# Patient Record
Sex: FEMALE | Race: WHITE
[De-identification: names, ages, dates, MRNs, and addresses within clinical notes are randomized per-mention and may not be internally consistent; named-entity substitution may affect disease eponyms.]

---

## 2018-02-08 ENCOUNTER — HOSPITAL ENCOUNTER (OUTPATIENT)
Dept: HOSPITAL 47 - RADUSWWP | Age: 43
Discharge: HOME | End: 2018-02-08
Payer: MEDICARE

## 2018-02-08 DIAGNOSIS — N20.0: Primary | ICD-10-CM

## 2018-02-08 DIAGNOSIS — N28.1: ICD-10-CM

## 2018-02-08 PROCEDURE — 76770 US EXAM ABDO BACK WALL COMP: CPT

## 2018-02-08 NOTE — US
EXAMINATION TYPE: US kidneys/renal and bladder

 

DATE OF EXAM: 2/8/2018

 

COMPARISON: US renal 9/14/2017, CT abdomen and pelvis 9/14/2017

 

CLINICAL HISTORY: N20.9 Urolithiasis. History of stones. Left flank pain 

 

EXAM MEASUREMENTS:

 

Right Kidney:  12.2 x 4.9 x 4.9 cm

Left Kidney: 12.2 x 4.4 x 4.9 cm

 

 

 

 

Right Kidney: Echogenic foci upper pole measuring 0.6 cm    

Left Kidney: Multiple echogenic foci visualized throughout, largest upper pole measuring 1.1 cm. Hypo
echoic, cystic area visualized in the upper pole measuring 2.4 x 2.0 x 1.6 cm, possible parapelvic cy
st vs other      

Bladder: Echogenic debris visualized within

**Bilateral Jets seen: No. Jets are not visualized after 3 minutes of scanning 

 

 

 

 

 

IMPRESSION:

Markedly abnormal left kidney remains present with multiple nonobstructing calculi and simple appeari
ng cysts. Consider nuclear medicine functional study to assess for left renal function if has not bee
n performed.

## 2018-10-16 ENCOUNTER — HOSPITAL ENCOUNTER (OUTPATIENT)
Dept: HOSPITAL 47 - RADUSWWP | Age: 43
End: 2018-10-16
Attending: FAMILY MEDICINE
Payer: MEDICARE

## 2018-10-16 DIAGNOSIS — R92.8: Primary | ICD-10-CM

## 2018-10-16 PROCEDURE — 77066 DX MAMMO INCL CAD BI: CPT

## 2018-10-16 NOTE — MM
Reason for exam: clinical finding.

Baseline mammogram.



History:

Family history of breast cancer in cousin and breast cancer in aunt.



[facility] Mammo w CAD SILVIA

Bilateral CC and MLO view(s) were taken.

Technologist: Shirley Stack RT (R)(M)

The breast tissue is heterogeneously dense. This may lower the sensitivity of 

mammography.  There is a posterior depth left upper outer quadrant mass 

corresponding to the sonographically normal node. There is a right mass in the 

upper outer quadrant at posterior depth at 11 o'clock corresponding to a cyst on 

ultrasound.



These results were verbally communicated with the patient and result sheet given 

to the patient on 10/16/18.





ASSESSMENT: Benign, BI-RAD 2



RECOMMENDATION:

Routine screening mammogram of both breasts in 1 year.

## 2018-10-16 NOTE — USB
Reason for exam: clinical finding.



History:

Family history of breast cancer in cousin and breast cancer in aunt.

Indicated problem(s): palpable abnormality in the right breast.



Physical Findings:

Nurse Summary: right breast palpable 11 o'clock upper outer quadrant, 0.5 x 0.5cm,

mobile, tender, bilateral nodularity/tenderness on exam (nurse ts).



US Breast BILAT

Technologist: Shirley Stack, RT (R)(M)

Right complete breast ultrasound includes all four quadrants, the retroareolar 

region and axilla. Finding demonstrates a 6 x 3 x 7mm oval, cystic, benign lesion 

at 11 o'clock.



Left complete breast ultrasound includes all four quadrants, the retroareolar 

region and axilla. Finding demonstrates a 6 x 3 x 11mm oval, cystic, benign lesion

at 4 o'clock corresponds to the palpable abnormality and a 5mm node at the 

axilla.



These results were verbally communicated with the patient and result sheet given 

to the patient on 10/16/18.





ASSESSMENT: Incomplete: need additional imaging evaluation, BI-RAD 0



RECOMMENDATION:

Follow-up diagnostic mammogram of both breasts.

## 2018-11-01 ENCOUNTER — HOSPITAL ENCOUNTER (OUTPATIENT)
Dept: HOSPITAL 47 - WWCWWP | Age: 43
Discharge: HOME | End: 2018-11-01
Attending: SURGERY
Payer: MEDICARE

## 2018-11-01 VITALS
HEART RATE: 77 BPM | TEMPERATURE: 99.9 F | DIASTOLIC BLOOD PRESSURE: 80 MMHG | SYSTOLIC BLOOD PRESSURE: 129 MMHG | RESPIRATION RATE: 18 BRPM

## 2018-11-01 VITALS — BODY MASS INDEX: 23.3 KG/M2

## 2018-11-01 DIAGNOSIS — Z53.9: Primary | ICD-10-CM

## 2018-11-01 NOTE — P.GSHP
History of Present Illness


H&P Date: 11/01/18


Chief Complaint: patient complains of tenderness in the right breast for 6 

months





     The patient is a 43 year old white female with a complaint of tenderness 

in her right breast.  This is been present for approximately 6 months.  She had 

her first mammogram and ultrasound performed on [id number].  On the ultrasound 

she was noted to have in the right breast a 6 x 7 mm cystic benign lesion at 11:

00.  In the left breast she was noted to have a 6 x 11 mm cystic lesion at 4:00 

corresponding to the palpable change and a 5 mm node in the axilla.  She then 

had a bilateral mammogram performed which revealed a left upper outer quadrant 

mass corresponding to the sonographically normal node.  In the right a mass in 

the upper outer quadrant corresponding to a cyst seen on ultrasound.  This was 

felt to be benign and it was recommended she have routine screening mammogram 

of both breast in 1 year.  The patient has no history of any trauma or 

infection in the breast. She does smoke and lives with at smoker. She does not 

drink pop. She drinks coffee, about a pot/day. She eats chocolate rarely.  The 

pain is not cyclical.





Family history:


1.sister: cervical cancer


2. cousin maternal: cervical cancer





Hormonal History:


menarche: 12


pregnancy: 3, 2 miscarriages, breast fed: none, first at 15


periods: partial hysterectomy for bleeding left her ovaries


BCP: 2 years


hormones: none





Past Surgical History:


1. partial nephrectomy for Wilhms tumor


2. hysterectomy


3. gallbladder


4. partial knee 


5. tonsil


 


Past Medical History:


1. kidney dysfunction


2. diabetic diet controlled





Social History:


smoke: 1/PPD for 15 years


alcohol: rare


drugs: none














- Constitutional


Constitutional: Reports sweats





- EENT


Comment: 





vertigo


Eyes: bilateral bulging eye, bilateral pain


Ears: right: tinnitus, deny: decreased hearing


Ears, nose, mouth and throat: Reports headache, Denies sore throat





- Breasts


Breasts: bilateral: as per HPI





- Cardiovascular


Comment: 





prolapsed mitral valve





- Respiratory


Comment: 





asthma, 


Respiratory: Denies cough, Denies 7





- Gastrointestinal


Comment: 





PUD


Gastrointestinal: Denies abdominal pain, Denies diarrhea, Denies nausea, Denies 

vomiting





- Genitourinary (Female)


Genitourinary: Reports hematuria, Reports kidney stones





- Menstruation


Menstruation: Reports post hysterectomy





- Musculoskeletal


Comment: 





arthritis/fibromyalgia





- Integumentary


Integumentary: Denies pruritus, Denies rash





- Neurological


Neurological: Reports numbness, Denies weakness





- Psychiatric


Psychiatric: Reports anxiety





- Endocrine


Comment: 





diabetes, over-active thyroid





- Hematologic/Lymphatic


Comment: 





none





- Allergic/Immunologic


Allergic/Immunologic: Reports seasonal allergies





Past Medical History


Past Surgical History: Cholecystectomy, Hysterectomy, Orthopedic Surgery


Past Psychological History: Anxiety


Smoking Status: Current every day smoker





Surgical - Exam





- General


well developed, well nourished, no distress





- Eyes


normal ocular movement





- ENT


no hearing loss, no congestion





- Neck


trachea midline





- Respiratory


normal respiratory effort, clear to auscultation





- Cardiovascular


Rhythm: regular


Heart Sounds: normal: S1, S2





- Abdomen


Abdomen: soft, non tender, no guarding, no rigid, no rebound





- Integumentary


no rash, no abnormal pigmentation





- Neurologic


no disoriented, no combative





- Musculoskeletal


normal gait, normal posture





- Psychiatric


oriented to time, oriented to person, oriented to place, speech is normal, 

memory intact





Breast examination:


Right breast: Multi-positional exam no dominant masses or nodules of concern 

noted


Right axilla: No adenopathy of concern


Left breast: Multi-positional exam of dominant masses or nodules of concern


Left axilla: No adenopathy of concern





Results





RADIOGRAPH reports reviewed





Assessment and Plan


Assessment: 





Impression:


1.  Pain right breast


2.  Fibrocystic breast changes


3.  Wilms tumor left kidney status post partial nephrectomy


4.  History of kidney stones


5.  arthritis


6.  Mitral valve prolapse


7.  Status post cholecystectomy


8.  Nicotine dependence


9.  Hypoparathyroidism,


10.  Diabetes


Plan:


1.  Discussion regarding fibrocystic breast disease and breast pain


2.  Patient will decide based on discussion regarding smoking and caffeine 

intake


3.  Medical management of medical problems


CC: Dr. Kraus

## 2019-02-15 ENCOUNTER — HOSPITAL ENCOUNTER (OUTPATIENT)
Dept: HOSPITAL 47 - RADUSWWP | Age: 44
End: 2019-02-15
Attending: FAMILY MEDICINE
Payer: MEDICARE

## 2019-02-15 DIAGNOSIS — N13.2: Primary | ICD-10-CM

## 2019-02-15 PROCEDURE — 74176 CT ABD & PELVIS W/O CONTRAST: CPT

## 2019-02-15 PROCEDURE — 76770 US EXAM ABDO BACK WALL COMP: CPT

## 2019-02-15 NOTE — US
EXAMINATION TYPE: US kidneys/renal and bladder

 

DATE OF EXAM: 2/15/2019

 

COMPARISON: 2/8/2018, 9/14/2017 and 2/6/2017 renal ultrasound as well as CT dated 9/14/2017

 

CLINICAL HISTORY: Kidney Pain N23. left kidney pain, known stones

 

EXAM MEASUREMENTS:

 

Right Kidney:  10.8 x 5.3 x 5.4 cm

Left Kidney: 11.4 x 6.8 x 7.2 cm

 

Right Kidney: No hydronephrosis or masses seen  

Left Kidney: multiple stones seen with hydronephrosis present, largest stone appears to be laterally 
= 1.3cm   

Bladder: wnl

**Bilateral Jets seen: yes

 

 

IMPRESSION:

1. Continued worsening of the markedly abnormal appearance of the left kidney with severe left hydron
ephrosis and multiple renal calculi. Cortical renal thinning indicates chronicity.

2. No hydronephrosis or nephrolithiasis on the right.

## 2019-02-15 NOTE — CT
EXAMINATION TYPE: CT abdomen pelvis wo con

 

DATE OF EXAM: 2/15/2019

 

COMPARISON: 9/14/2017

 

INDICATION: left flank pain, hx of stones

 

DLP: 229 mGycm, Automated exposure control for dose reduction was used.

 

CONTRAST:  0 mL of Isovue 300. 

                        Study performed without Oral Contrast

 

TECHNIQUE: Axial images were obtained from above the diaphragm to the pubic rami in the axial plane a
t 5 mm thick sections.  Reconstructed images are reviewed on the computer in the coronal plane.  

 

FINDINGS:

 

Limited CT sections are obtained the lung bases.  The lung bases are clear.

 

CT ABDOMEN:

 

Liver: Normal

 

Spleen: Normal

 

Pancreas: Normal

 

Adrenal glands: The adrenal glands are normal.

 

Gallbladder: Surgically absent  

 

Kidneys: No masses are evident. There is moderate to marked left hydronephrosis. Extensive nonobstruc
ting renal stones are scattered throughout the upper and lower poles the slightly malrotated left kid
yudelka. Calcifications measure approximately 0.3-0.4 cm each. There is mild left hydroureter within the 
proximal portion. Mid pelvic portion is somewhat difficult to visualize. However, there are 2 calcifi
cations just proximal to the left ureterovesical junction which could be distal ureteral stones. This
 measures 0.3 cm may be at the left ureterovesical junction, series 3 image 114 in the distal left ur
eter measuring 0.2 cm, series 3 image 111. There are phleboliths present within the pelvis. The dista
l right ureteral stone is not excluded. Mild right hydronephrosis may be present. Hydroureter however
 is not identified No cysts are present. 

 

Aorta: Normal

 

Inferior vena cava: Normal.

 

CT PELVIS: 

Loops of bowel within the abdomen and pelvis are normal.     Study is performed without oral contrast
 limiting bowel evaluation.

 

Appendix: Not visualized. No suspicious tubular structures or inflammatory changes are identified.

 

Urinary bladder: Normal. 

 

Genitourinary structures: Uterus is not identified. Adnexal regions appear clear

 

Osseous structures: No suspicious lytic or sclerotic lesions.

 

IMPRESSIONS:

1.  Moderate to marked left hydronephrosis. Multiple nonobstructing renal stones are within the malro
tated left kidney. These measure 0.3 to 0.4 cm in size.

2. Mild left hydroureter within the proximal portion. 2 distal left ureteral stones may be present. D
ifferential could include phleboliths. Correlate with symptoms. 

3. Mild right hydronephrosis. No obstructing renal or ureteral stones are evident. Distal right urete
ral stone however is not entirely excluded. No significant hydroureter is evident on the right.

## 2020-11-04 ENCOUNTER — HOSPITAL ENCOUNTER (OUTPATIENT)
Dept: HOSPITAL 47 - RADNMMAIN | Age: 45
Discharge: HOME | End: 2020-11-04
Attending: UROLOGY
Payer: MEDICARE

## 2020-11-04 DIAGNOSIS — N13.30: Primary | ICD-10-CM

## 2020-11-04 DIAGNOSIS — Z88.5: ICD-10-CM

## 2020-11-04 DIAGNOSIS — Z88.0: ICD-10-CM

## 2020-11-04 DIAGNOSIS — Z88.8: ICD-10-CM

## 2020-11-04 PROCEDURE — 78708 K FLOW/FUNCT IMAGE W/DRUG: CPT

## 2020-11-04 NOTE — NM
EXAMINATION TYPE: NM lasix renogram

 

DATE OF EXAM: 11/4/2020

 

COMPARISON: 2/8/2020

 

HISTORY: Pain, hydronephrosis

 

Following administration of 9.74 mCi Tc 99m MAG3 with 20mg Lasix. Immediate images post injection

 

FINDINGS: 

 

Left: 47.1 %. 

Right: 52.9 %. 

 

Max renal flow left: 11.5 minutes. 

Max renal flow right: 52.9 minutes.

 

Satisfactory accumulation of radiotracer within both renal collecting systems. After the administrati
on of Lasix, there is prompt excretion from both collecting systems. 

 

T 1/2 left: 18.5 minutes. 

T 1/2 right: 11.6 minutes. 

 

 

 

IMPRESSION: 

1. Split renal function of 53% on the right and 47% on the left. Additionally, there appears to be re
duced radiotracer accumulation within the left kidney and a longer T1/2 time compatible with hydronep
hrosis.

## 2021-04-15 ENCOUNTER — HOSPITAL ENCOUNTER (OUTPATIENT)
Dept: HOSPITAL 47 - LABWHC1 | Age: 46
Discharge: HOME | End: 2021-04-15
Attending: FAMILY MEDICINE
Payer: MEDICARE

## 2021-04-15 DIAGNOSIS — Z20.822: Primary | ICD-10-CM

## 2021-10-15 ENCOUNTER — HOSPITAL ENCOUNTER (OUTPATIENT)
Dept: HOSPITAL 47 - LABWHC1 | Age: 46
Discharge: HOME | End: 2021-10-15
Attending: FAMILY MEDICINE
Payer: MEDICARE

## 2021-10-15 DIAGNOSIS — T14.8XXA: Primary | ICD-10-CM
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
## 2021-10-15 LAB
ERYTHROCYTE [DISTWIDTH] IN BLOOD BY AUTOMATED COUNT: 4.67 X 10*6/UL (ref 4.1–5.2)
ERYTHROCYTE [DISTWIDTH] IN BLOOD: 13.4 % (ref 11.5–14.5)
HCT VFR BLD AUTO: 43.4 % (ref 37.2–46.3)
HGB BLD-MCNC: 14.1 G/DL (ref 12–15)
INR PPP: 0.99 (ref 0.9–1.11)
MCH RBC QN AUTO: 30.2 PG (ref 27–32)
MCHC RBC AUTO-ENTMCNC: 32.5 G/DL (ref 32–37)
MCV RBC AUTO: 92.9 FL (ref 80–97)
PLATELET # BLD AUTO: 213 X 10*3/UL (ref 140–440)
PT BLD: 10.8 SEC (ref 9.9–11.9)
WBC # BLD AUTO: 8.64 X 10*3/UL (ref 4.5–10)

## 2021-10-15 PROCEDURE — 36415 COLL VENOUS BLD VENIPUNCTURE: CPT

## 2021-10-15 PROCEDURE — 85027 COMPLETE CBC AUTOMATED: CPT

## 2021-10-15 PROCEDURE — 85610 PROTHROMBIN TIME: CPT

## 2021-10-15 PROCEDURE — 81241 F5 GENE: CPT

## 2022-08-02 ENCOUNTER — HOSPITAL ENCOUNTER (OUTPATIENT)
Dept: HOSPITAL 47 - RADCTMAIN | Age: 47
Discharge: HOME | End: 2022-08-02
Attending: FAMILY MEDICINE
Payer: MEDICARE

## 2022-08-02 DIAGNOSIS — R55: Primary | ICD-10-CM

## 2022-08-02 LAB — BUN SERPL-SCNC: 17 MG/DL (ref 7–17)

## 2022-08-02 PROCEDURE — 36415 COLL VENOUS BLD VENIPUNCTURE: CPT

## 2022-08-02 PROCEDURE — 70470 CT HEAD/BRAIN W/O & W/DYE: CPT

## 2022-08-02 PROCEDURE — 84520 ASSAY OF UREA NITROGEN: CPT

## 2022-08-02 PROCEDURE — 82565 ASSAY OF CREATININE: CPT

## 2022-08-02 NOTE — CT
EXAMINATION TYPE: CT brain wo/w con

CT DLP: 2196 mGycm, Automated exposure control for dose reduction was used.

 

DATE OF EXAM: 8/2/2022 6:07 PM

 

COMPARISON: CT brain 6/13/201.

 

CLINICAL INDICATION:Female, 47 years old with history of R55 SYNCOPE AND COLLAPSE, syncope, vertigo a
nd visual changes

 

TECHNIQUE: Axial CT images of the brain were obtained with coronal and sagittal reformats created and
 reviewed.

Contrast used:100 mL of Isovue 300 with IV Contrast, 

Oral contrast used: none.

 

FINDINGS:

Extra-axial spaces: No abnormal extra-axial fluid collections.

Ventricular system: Within normal limits

Cerebral parenchyma: No acute intraparenchymal hemorrhage or mass effect.  The gray-white junction is
 well differentiated. No abnormal enhancement is seen after the administration of intravenous contras
t.

Cerebellum: Unremarkable.

Mass effect: No evidence of midline shift.

Intracranial vasculature: No evidence for aneurysm or high-grade stenosis.

Soft tissues: Normal.

Calvarium/osseous structures: No depressed skull fracture.

Paranasal sinuses and mastoid air cells: Clear.

Visualized orbits: Orbital contents are intact.

 

IMPRESSION:

1.  No acute intracranial process.

2.  No abnormal postcontrast enhancement.

## 2024-05-23 ENCOUNTER — HOSPITAL ENCOUNTER (OUTPATIENT)
Dept: HOSPITAL 47 - RADCTMAIN | Age: 49
Discharge: HOME | End: 2024-05-23
Attending: UROLOGY
Payer: MEDICARE

## 2024-05-23 DIAGNOSIS — Z90.49: ICD-10-CM

## 2024-05-23 DIAGNOSIS — N20.0: Primary | ICD-10-CM

## 2024-05-23 PROCEDURE — 74176 CT ABD & PELVIS W/O CONTRAST: CPT

## 2024-05-23 NOTE — CT
EXAMINATION TYPE: CT abdomen pelvis wo con

CT DLP: 327.10 mGycm, Automated exposure control for dose reduction was used.

 

DATE OF EXAM: 5/23/2024 8:57 AM

 

COMPARISON: CT abdomen 2/8/2020, CT abdomen and pelvis 2/15/2019

CLINICAL INDICATION:Female, 49 years old with history of N20.0 calculus of kidney; Calculus of kidney
. c/o flank pain, urination problems

 

TECHNIQUE:  Standard  CT of the abdomen and pelvis without IV or oral contrast. Lack of IV or oral co
ntrast limits evaluation of solid and hollow organ viscera. Coronal and sagittal reformats were perfo
rmed. 

 

FINDINGS: 

LOWER CHEST: Stable lingular 4 mm pulmonary nodule dating back 2020 and is a benign.

 

ABDOMEN

LIVER: Unremarkable noncontrast appearance

GALLBLADDER AND BILE DUCTS: Gallbladder is surgically absent. No biliary ductal dilatation.

PANCREAS: Unremarkable noncontrast appearance.

SPLEEN: Unremarkable noncontrast appearance.

ADRENAL GLANDS: Unremarkable noncontrast appearance.

KIDNEYS AND URETERS: No hydroureter identified. No right hydronephrosis. There is again malrotated ap
pearance of the left kidney with chronic mild hydronephrosis versus parapelvic cysts demonstrated. Co
rtical thinning of the left kidney demonstrated. Nonobstructive right superior pole 3 mm calculus. Mu
ltiple nonobstructive calculi within the left kidney with largest measuring up to 1 cm in the lower p
ole. 

 

PELVIS

BLADDER: Incompletely distended but grossly unremarkable.

REPRODUCTIVE: Uterus appears surgically absent.

 

ABDOMEN & PELVIS

STOMACH AND BOWEL: No focal bowel wall thickening or surrounding inflammatory changes.

Appendix is within normal limits. No evidence of bowel obstruction. 

PERITONEUM: No evidence of pneumoperitoneum or free fluid.

 

VASCULATURE: Mild atherosclerotic calcifications are present throughout the abdominal aorta and its b
ranches. No evidence of aortic aneurysm. Pelvic phleboliths demonstrated.

MUSCULOSKELETAL: No acute osseous abnormalities. Degenerative changes of the spine most pronounced at
 L5-S1.

LYMPH NODES: No gross evidence for lymphadenopathy.

SOFT TISSUE/ABDOMINAL WALL: Benign calcification within the left lower back soft tissues.

 

IMPRESSION:

1.  Chronic appearing mild left-sided hydronephrosis versus parapelvic cysts without obstructing calc
ulus visualized. No hydroureter. Evaluation is limited due to lack of intravenous contrast.

2. Multiple nonobstructive left renal calculi with single nonobstructive right renal calculus.

## 2024-06-13 ENCOUNTER — HOSPITAL ENCOUNTER (OUTPATIENT)
Dept: HOSPITAL 47 - RADNMMAIN | Age: 49
Discharge: HOME | End: 2024-06-13
Attending: UROLOGY
Payer: MEDICARE

## 2024-06-13 DIAGNOSIS — N13.30: Primary | ICD-10-CM

## 2024-06-13 PROCEDURE — 78708 K FLOW/FUNCT IMAGE W/DRUG: CPT

## 2024-06-15 NOTE — NM
EXAMINATION TYPE: NM lasix renogram

 

DATE OF EXAM: 6/13/2024

 

COMPARISON: 11/4/2020

 

CLINICAL INDICATION: Female, 49 years old with history of N13.30 UNSPECIFIED HYDRONEPHROSIS;

 

Following administration of 9.9 mCi Tc 99m MAG3 with 20mg Lasix. Immediate images post injection

 

FINDINGS: 

 

Left: 46.8 %. 

Right: 53.2 %. 

 

Max renal flow left: 11 minutes. 

Max renal flow right: 7 minutes.

 

Satisfactory accumulation of radiotracer within both renal collecting systems. After the administrati
on of Lasix, there is prompt excretion from both collecting systems. 

 

T 1/2 left: 25.8 minutes minutes. 

T 1/2 right: 13.5 minutes minutes. 

 

IMPRESSION: 

Dilated nonobstructed left renal collecting system. Normal right renal collecting system. Findings si
milar to prior on 11/4/2020.